# Patient Record
Sex: FEMALE | Race: BLACK OR AFRICAN AMERICAN | NOT HISPANIC OR LATINO
[De-identification: names, ages, dates, MRNs, and addresses within clinical notes are randomized per-mention and may not be internally consistent; named-entity substitution may affect disease eponyms.]

---

## 2023-09-01 ENCOUNTER — APPOINTMENT (OUTPATIENT)
Dept: PODIATRY | Facility: CLINIC | Age: 48
End: 2023-09-01
Payer: COMMERCIAL

## 2023-09-01 DIAGNOSIS — M86.9 OSTEOMYELITIS, UNSPECIFIED: ICD-10-CM

## 2023-09-01 PROCEDURE — 99204 OFFICE O/P NEW MOD 45 MIN: CPT | Mod: 25

## 2023-09-01 PROCEDURE — 73630 X-RAY EXAM OF FOOT: CPT | Mod: RT

## 2023-09-06 PROBLEM — M86.9 OSTEOMYELITIS: Status: ACTIVE | Noted: 2023-09-05

## 2023-09-06 NOTE — PROCEDURE
[FreeTextEntry1] : X-rays were taken to evaluate for bony irregularity or osteomyelitis. X-ray Report: (Right foot - 3 views) I got x-rays that demonstrate good metatarsal parabola without regeneration of bone, without gas in tissue, periosteal break or osteomyelitis.

## 2023-09-06 NOTE — PHYSICAL EXAM
[Delayed in the Left Toes] : capillary refills delayed in the left toes [0] : left foot dorsalis pedis 0 [de-identified] : TMA right side. [FreeTextEntry4] : absent vibratory  [FreeTextEntry8] : absent vibratory  [FreeTextEntry1] : [place]-Rody monofilament testing absent at the hallux, 1st MPJ and heel bilateral.

## 2023-09-06 NOTE — HISTORY OF PRESENT ILLNESS
[FreeTextEntry1] : Patient presents today for a second opinion regarding her right foot. She is S/P TMA amputation. She is a patient of Dr. Davidson who she plans of following up with and she has been seeing Dr. Shannan Le for evaluation. She recently developed a new wound at the right foot and she has been seeing Dr. Le and is in a surgical shoe. She presents today with her daughter. Dr. Le put her on Regranex that she has been using. She is blind and presents today with her daughter. She presents with a surgical shoe. She has insulin dependent diabetes. Her A1c is 10. She states her sugars are 145 this morning. She has Stage 3 renal disease, high blood pressure and hypercholesterolemia. She has peripheral vascular disease and has seen Dr. Maldonado. She told me that one of the 3 arteries works and she is overdue to see Dr. Maldonado.

## 2023-09-06 NOTE — ASSESSMENT
[FreeTextEntry1] : Impression: She had developed a blister that is stable and healing in. Gen. atherosclerosis. TMA right side.  Treatment: Her daughter has been doing the dressings as the patient is legally blind. First I explained to them that she should have a walker in order to further off-load the area. She did not come in with her Plastazote orthotic and she has been in the surgical shoe. I just feel that with a walker she could off-load this area much better and that is extremely important. She also feels the orthotic had flattened down and I think she needs to go back to the orthotist and get that refurbished or remade but I am not seeing it. I recommend instead of the band-aid to use gauze on it and could continue with Regranex on the small superficial healing wound. I do feel while she is watching TV she could let air get to it. There are no clinical signs of infection. Her A1c is high at 10. She needs to get that under better control. The fact that she has renal disease and poor circulation, this is high risk to go downhill and get bad. She needs to definitely get back to vascular. She has not seen vascular in over 6 months and has peripheral vascular disease and now has a new problem. She states that she is going to continue to follow-up. She would prefer to follow-up with Dr. Shi who she was treating and is part of [facility] system where she goes or even Dr. Le. She needs to follow my recommendations in terms of off-loading this area and continuing the wound care and following up with vascular. If she has any difficulty following up with those doctors I am always available.  She states she understands and will follow my recommendations. Today I put a gauze dressing on which is what I am recommending she continues to do.  Her daughter will inspect the foot daily.  Any deterioration or worsening or changes whatsoever they need to immediately to go the Emergency Room as she is a high-risk foot potential for amputation because of all of her comorbidities. They understand. Will follow-up here as needed.

## 2023-09-26 ENCOUNTER — APPOINTMENT (OUTPATIENT)
Dept: PODIATRY | Facility: CLINIC | Age: 48
End: 2023-09-26

## 2023-10-04 ENCOUNTER — APPOINTMENT (OUTPATIENT)
Dept: PODIATRY | Facility: CLINIC | Age: 48
End: 2023-10-04
Payer: MEDICAID

## 2023-10-04 PROCEDURE — 99212 OFFICE O/P EST SF 10 MIN: CPT | Mod: 25

## 2023-10-04 PROCEDURE — 11055 PARING/CUTG B9 HYPRKER LES 1: CPT

## 2023-11-18 ENCOUNTER — EMERGENCY (EMERGENCY)
Facility: HOSPITAL | Age: 48
LOS: 1 days | Discharge: LEFT BEFORE DISCHARGE | End: 2023-11-18
Attending: EMERGENCY MEDICINE | Admitting: EMERGENCY MEDICINE
Payer: MEDICAID

## 2023-11-18 VITALS
DIASTOLIC BLOOD PRESSURE: 87 MMHG | RESPIRATION RATE: 18 BRPM | TEMPERATURE: 98 F | OXYGEN SATURATION: 99 % | SYSTOLIC BLOOD PRESSURE: 170 MMHG | HEART RATE: 97 BPM

## 2023-11-18 PROCEDURE — 93010 ELECTROCARDIOGRAM REPORT: CPT

## 2023-11-18 PROCEDURE — 99285 EMERGENCY DEPT VISIT HI MDM: CPT | Mod: 25

## 2023-11-18 RX ORDER — SODIUM CHLORIDE 9 MG/ML
1000 INJECTION, SOLUTION INTRAVENOUS ONCE
Refills: 0 | Status: DISCONTINUED | OUTPATIENT
Start: 2023-11-18 | End: 2023-11-19

## 2023-11-18 NOTE — ED ADULT TRIAGE NOTE - CHIEF COMPLAINT QUOTE
Pt c/o muscle cramping x several weeks. States she feels like her limbs are "tightening up". Also endorsing chest discomfort today. Denies N/V/D, fevers/chills,SOB. PMHx DM2, HTN, HLD, partial R foot amputation

## 2023-11-19 VITALS
DIASTOLIC BLOOD PRESSURE: 65 MMHG | SYSTOLIC BLOOD PRESSURE: 134 MMHG | TEMPERATURE: 99 F | OXYGEN SATURATION: 100 % | RESPIRATION RATE: 18 BRPM | HEART RATE: 81 BPM

## 2023-11-19 DIAGNOSIS — Z89.431 ACQUIRED ABSENCE OF RIGHT FOOT: Chronic | ICD-10-CM

## 2023-11-19 LAB
ALBUMIN SERPL ELPH-MCNC: 3.8 G/DL — SIGNIFICANT CHANGE UP (ref 3.3–5)
ALBUMIN SERPL ELPH-MCNC: 3.8 G/DL — SIGNIFICANT CHANGE UP (ref 3.3–5)
ALP SERPL-CCNC: 107 U/L — SIGNIFICANT CHANGE UP (ref 40–120)
ALP SERPL-CCNC: 107 U/L — SIGNIFICANT CHANGE UP (ref 40–120)
ALT FLD-CCNC: 15 U/L — SIGNIFICANT CHANGE UP (ref 4–33)
ALT FLD-CCNC: 15 U/L — SIGNIFICANT CHANGE UP (ref 4–33)
ANION GAP SERPL CALC-SCNC: 16 MMOL/L — HIGH (ref 7–14)
ANION GAP SERPL CALC-SCNC: 16 MMOL/L — HIGH (ref 7–14)
AST SERPL-CCNC: 24 U/L — SIGNIFICANT CHANGE UP (ref 4–32)
AST SERPL-CCNC: 24 U/L — SIGNIFICANT CHANGE UP (ref 4–32)
BASOPHILS # BLD AUTO: 0.03 K/UL — SIGNIFICANT CHANGE UP (ref 0–0.2)
BASOPHILS # BLD AUTO: 0.03 K/UL — SIGNIFICANT CHANGE UP (ref 0–0.2)
BASOPHILS NFR BLD AUTO: 0.3 % — SIGNIFICANT CHANGE UP (ref 0–2)
BASOPHILS NFR BLD AUTO: 0.3 % — SIGNIFICANT CHANGE UP (ref 0–2)
BILIRUB SERPL-MCNC: <0.2 MG/DL — SIGNIFICANT CHANGE UP (ref 0.2–1.2)
BILIRUB SERPL-MCNC: <0.2 MG/DL — SIGNIFICANT CHANGE UP (ref 0.2–1.2)
BUN SERPL-MCNC: 33 MG/DL — HIGH (ref 7–23)
BUN SERPL-MCNC: 33 MG/DL — HIGH (ref 7–23)
CALCIUM SERPL-MCNC: 8.9 MG/DL — SIGNIFICANT CHANGE UP (ref 8.4–10.5)
CALCIUM SERPL-MCNC: 8.9 MG/DL — SIGNIFICANT CHANGE UP (ref 8.4–10.5)
CHLORIDE SERPL-SCNC: 100 MMOL/L — SIGNIFICANT CHANGE UP (ref 98–107)
CHLORIDE SERPL-SCNC: 100 MMOL/L — SIGNIFICANT CHANGE UP (ref 98–107)
CO2 SERPL-SCNC: 20 MMOL/L — LOW (ref 22–31)
CO2 SERPL-SCNC: 20 MMOL/L — LOW (ref 22–31)
CREAT SERPL-MCNC: 2.3 MG/DL — HIGH (ref 0.5–1.3)
CREAT SERPL-MCNC: 2.3 MG/DL — HIGH (ref 0.5–1.3)
EGFR: 25 ML/MIN/1.73M2 — LOW
EGFR: 25 ML/MIN/1.73M2 — LOW
EOSINOPHIL # BLD AUTO: 0.23 K/UL — SIGNIFICANT CHANGE UP (ref 0–0.5)
EOSINOPHIL # BLD AUTO: 0.23 K/UL — SIGNIFICANT CHANGE UP (ref 0–0.5)
EOSINOPHIL NFR BLD AUTO: 2.7 % — SIGNIFICANT CHANGE UP (ref 0–6)
EOSINOPHIL NFR BLD AUTO: 2.7 % — SIGNIFICANT CHANGE UP (ref 0–6)
GLUCOSE SERPL-MCNC: 165 MG/DL — HIGH (ref 70–99)
GLUCOSE SERPL-MCNC: 165 MG/DL — HIGH (ref 70–99)
HCT VFR BLD CALC: 32.9 % — LOW (ref 34.5–45)
HCT VFR BLD CALC: 32.9 % — LOW (ref 34.5–45)
HGB BLD-MCNC: 10.9 G/DL — LOW (ref 11.5–15.5)
HGB BLD-MCNC: 10.9 G/DL — LOW (ref 11.5–15.5)
IANC: 5.6 K/UL — SIGNIFICANT CHANGE UP (ref 1.8–7.4)
IANC: 5.6 K/UL — SIGNIFICANT CHANGE UP (ref 1.8–7.4)
IMM GRANULOCYTES NFR BLD AUTO: 0.5 % — SIGNIFICANT CHANGE UP (ref 0–0.9)
IMM GRANULOCYTES NFR BLD AUTO: 0.5 % — SIGNIFICANT CHANGE UP (ref 0–0.9)
LYMPHOCYTES # BLD AUTO: 2.17 K/UL — SIGNIFICANT CHANGE UP (ref 1–3.3)
LYMPHOCYTES # BLD AUTO: 2.17 K/UL — SIGNIFICANT CHANGE UP (ref 1–3.3)
LYMPHOCYTES # BLD AUTO: 25.1 % — SIGNIFICANT CHANGE UP (ref 13–44)
LYMPHOCYTES # BLD AUTO: 25.1 % — SIGNIFICANT CHANGE UP (ref 13–44)
MAGNESIUM SERPL-MCNC: 1.9 MG/DL — SIGNIFICANT CHANGE UP (ref 1.6–2.6)
MAGNESIUM SERPL-MCNC: 1.9 MG/DL — SIGNIFICANT CHANGE UP (ref 1.6–2.6)
MCHC RBC-ENTMCNC: 28.8 PG — SIGNIFICANT CHANGE UP (ref 27–34)
MCHC RBC-ENTMCNC: 28.8 PG — SIGNIFICANT CHANGE UP (ref 27–34)
MCHC RBC-ENTMCNC: 33.1 GM/DL — SIGNIFICANT CHANGE UP (ref 32–36)
MCHC RBC-ENTMCNC: 33.1 GM/DL — SIGNIFICANT CHANGE UP (ref 32–36)
MCV RBC AUTO: 87 FL — SIGNIFICANT CHANGE UP (ref 80–100)
MCV RBC AUTO: 87 FL — SIGNIFICANT CHANGE UP (ref 80–100)
MONOCYTES # BLD AUTO: 0.59 K/UL — SIGNIFICANT CHANGE UP (ref 0–0.9)
MONOCYTES # BLD AUTO: 0.59 K/UL — SIGNIFICANT CHANGE UP (ref 0–0.9)
MONOCYTES NFR BLD AUTO: 6.8 % — SIGNIFICANT CHANGE UP (ref 2–14)
MONOCYTES NFR BLD AUTO: 6.8 % — SIGNIFICANT CHANGE UP (ref 2–14)
NEUTROPHILS # BLD AUTO: 5.6 K/UL — SIGNIFICANT CHANGE UP (ref 1.8–7.4)
NEUTROPHILS # BLD AUTO: 5.6 K/UL — SIGNIFICANT CHANGE UP (ref 1.8–7.4)
NEUTROPHILS NFR BLD AUTO: 64.6 % — SIGNIFICANT CHANGE UP (ref 43–77)
NEUTROPHILS NFR BLD AUTO: 64.6 % — SIGNIFICANT CHANGE UP (ref 43–77)
NRBC # BLD: 0 /100 WBCS — SIGNIFICANT CHANGE UP (ref 0–0)
NRBC # BLD: 0 /100 WBCS — SIGNIFICANT CHANGE UP (ref 0–0)
NRBC # FLD: 0 K/UL — SIGNIFICANT CHANGE UP (ref 0–0)
NRBC # FLD: 0 K/UL — SIGNIFICANT CHANGE UP (ref 0–0)
PLATELET # BLD AUTO: 256 K/UL — SIGNIFICANT CHANGE UP (ref 150–400)
PLATELET # BLD AUTO: 256 K/UL — SIGNIFICANT CHANGE UP (ref 150–400)
POTASSIUM SERPL-MCNC: 5.2 MMOL/L — SIGNIFICANT CHANGE UP (ref 3.5–5.3)
POTASSIUM SERPL-MCNC: 5.2 MMOL/L — SIGNIFICANT CHANGE UP (ref 3.5–5.3)
POTASSIUM SERPL-SCNC: 5.2 MMOL/L — SIGNIFICANT CHANGE UP (ref 3.5–5.3)
POTASSIUM SERPL-SCNC: 5.2 MMOL/L — SIGNIFICANT CHANGE UP (ref 3.5–5.3)
PROT SERPL-MCNC: 7.4 G/DL — SIGNIFICANT CHANGE UP (ref 6–8.3)
PROT SERPL-MCNC: 7.4 G/DL — SIGNIFICANT CHANGE UP (ref 6–8.3)
RBC # BLD: 3.78 M/UL — LOW (ref 3.8–5.2)
RBC # BLD: 3.78 M/UL — LOW (ref 3.8–5.2)
RBC # FLD: 12.8 % — SIGNIFICANT CHANGE UP (ref 10.3–14.5)
RBC # FLD: 12.8 % — SIGNIFICANT CHANGE UP (ref 10.3–14.5)
SODIUM SERPL-SCNC: 136 MMOL/L — SIGNIFICANT CHANGE UP (ref 135–145)
SODIUM SERPL-SCNC: 136 MMOL/L — SIGNIFICANT CHANGE UP (ref 135–145)
TROPONIN T, HIGH SENSITIVITY RESULT: 87 NG/L — CRITICAL HIGH
TROPONIN T, HIGH SENSITIVITY RESULT: 87 NG/L — CRITICAL HIGH
WBC # BLD: 8.66 K/UL — SIGNIFICANT CHANGE UP (ref 3.8–10.5)
WBC # BLD: 8.66 K/UL — SIGNIFICANT CHANGE UP (ref 3.8–10.5)
WBC # FLD AUTO: 8.66 K/UL — SIGNIFICANT CHANGE UP (ref 3.8–10.5)
WBC # FLD AUTO: 8.66 K/UL — SIGNIFICANT CHANGE UP (ref 3.8–10.5)

## 2023-11-19 PROCEDURE — 71045 X-RAY EXAM CHEST 1 VIEW: CPT | Mod: 26

## 2023-11-19 RX ORDER — ASPIRIN/CALCIUM CARB/MAGNESIUM 324 MG
324 TABLET ORAL ONCE
Refills: 0 | Status: COMPLETED | OUTPATIENT
Start: 2023-11-19 | End: 2023-11-19

## 2023-11-19 NOTE — ED PROVIDER NOTE - NSFOLLOWUPINSTRUCTIONS_ED_ALL_ED_FT
You reported to the hospital with ongoing muscle aches in your arms, neck, and legs as well as chest pain. You were evaluated in the hospital and had findings concerning for damage to your heart, notably that your troponin level was elevated to 87. This is a level that is used to track damage in a heart attack and would normally require hospitalization for monitoring and potential intervention. This is concerning in your case due to your ongoing chest pain which is often associated with heart attacks.    We discussed the risks of leaving the hospital without a full cardiac work-up given your current presentation and that you are at risk for developing a heart attack. You expressed understanding of the risks and have elected to follow up with your outpatient cardiologist. By leaving [facility] you have a significant risk of having a heart attack or passing away.    We have provided a copy of all of your laboratory work-up here in the hospital, please provide this information to your cardiologist and follow up with him as soon as possible.    Please start taking 81mg of aspirin (1 baby aspirin) every day while you are awaiting evaluation by your cardiologist.    Return to the hospital immediately if:  - You begin to experience a sharp pain in your chest that radiates to your left shoulder  - You begin to feel dizzy or confused  - You become acutely short of breath  - You faint  - You become easily winded walking 30ft or less

## 2023-11-19 NOTE — ED PROVIDER NOTE - OBJECTIVE STATEMENT
The patient is a 47yo woman with a history DM, HTN, HLD, and partial R foot amputation presenting with muscle cramps. The patient has a history of uncontrolled hypertension for which she is seeking optimization of her drug regimen with her PCP and cardiologist. The patient recently developed lower extremity swelling secondary to one of her antihypertensive medications and was started on lasix to improve fluid retention. At a recent outpatient appointment, the patient's potassium was found to be increased to 5.4. The patient was encouraged to increase PO intake. Upon discussion with the patient, she has not always taken her lasix medication.    The patient now presents with sporadic muscles cramps in her neck, arms, and legs. The patient is says these are intermittent but increasing in frequency over the past few days. The patient also endorses intermittent central chest pain that does not radiate.

## 2023-11-19 NOTE — ED PROVIDER NOTE - PATIENT PORTAL LINK FT
You can access the FollowMyHealth Patient Portal offered by [facility] by registering at the following website: [email or web address]. By joining [facility]’s FollowMyHealth portal, you will also be able to view your health information using other applications (apps) compatible with our system.

## 2023-11-19 NOTE — ED ADULT NURSE NOTE - NSFALLUNIVINTERV_ED_ALL_ED
Bed/Stretcher in lowest position, wheels locked, appropriate side rails in place/Call bell, personal items and telephone in reach/Instruct patient to call for assistance before getting out of bed/chair/stretcher/Non-slip footwear applied when patient is off stretcher/[place] to call system/Physically safe environment - no spills, clutter or unnecessary equipment/Purposeful proactive rounding/Room/bathroom lighting operational, light cord in reach

## 2023-11-19 NOTE — ED PROVIDER NOTE - ATTENDING CONTRIBUTION TO CARE
48F HTN HLD DM R TMA p/w muscle cramping.  BP has been hard to control has been changing meds.  Per pt K was elevated to 5.4. Noted swelling to LE after recent med change, has been put on lasix, but stopped after swelling improved.  Muscle cramps in arms, legs x 2 weeks.  CP started today SSCP, no associated symptoms.  Pt concerned for hyperkalemia.  Plan check labs, trop, EKG, rx fluids, reass.  EKG SR at 91 no srinivas no std TWI aVL   qtc 430.  Found to have abnormal trop.  Pt advised she is likely having a heart attack and should stay in hospital and see a cardiologist.  Pt does not want to stay - asked several times- pt prefers to follow up with her own cardiologist as outpt. Pt does not elaborate why.  Pt has capacity to make this decision.  Pt understands risk of death, MI at any point.  Pt understands she can return at any time to continue her evaluation.    VS:  unremarkable except HTN    GEN - NAD;  malaise;   A+O x3   HEAD - NC/AT     ENT - PEERL, EOMI, mucous membranes    moist , no discharge      NECK: Neck supple, non-tender without lymphadenopathy, no masses, no JVD  PULM - CTA b/l,  symmetric breath sounds  COR -  normal heart sounds    ABD - , ND, NT, soft,  BACK - no CVA tenderness, nontender spine     EXTREMS - no edema, no deformity, warm and well perfused    SKIN - no rash    or bruising      NEUROLOGIC - alert, face symmetric, speech fluent, sensation nl, motor no focal deficit.

## 2023-11-19 NOTE — ED ADULT NURSE NOTE - NSFALLOOBATTEMPT_ED_ALL_ED
Render In Strict Bullet Format?: No
Detail Level: Zone
Continue Regimen: Myorisan 30 mg capsule: Take one capsule po BID
No

## 2023-11-19 NOTE — ED ADULT NURSE NOTE - OBJECTIVE STATEMENT
Facilitator RN: Pt A&Ox4 ambulatory, PMH DM2, partial R foot amputation, HTN, presenting to the ED (RM 27) c/o muscle cramping x couple of months. Pt states has been endorsing intermittent muscle cramping. Pt states today had muscle cramping to left arm L side of body and LLE. Pt also states was having chest discomfort today. Pt states on October, PCP informed her that potassium level was 5.6. Pt states was recommended to drink plenty water. Pt states never had a follow-up for blood-work. Pt denies any other complaints. Denies any chest discomfort at this moment. Pt refusing IV placement, MD Gotti made aware, pt butterflied for labs, pitcher of water handed to patient as per MD orders, Safety precautions implemented as per protocol, plan of care ongoing. Report endorsed to primary RN Oneyda.

## 2023-12-06 ENCOUNTER — APPOINTMENT (OUTPATIENT)
Dept: PODIATRY | Facility: CLINIC | Age: 48
End: 2023-12-06
Payer: MEDICAID

## 2023-12-06 DIAGNOSIS — I70.91 GENERALIZED ATHEROSCLEROSIS: ICD-10-CM

## 2023-12-06 PROBLEM — E78.5 HYPERLIPIDEMIA, UNSPECIFIED: Chronic | Status: ACTIVE | Noted: 2023-11-19

## 2023-12-06 PROBLEM — I10 ESSENTIAL (PRIMARY) HYPERTENSION: Chronic | Status: ACTIVE | Noted: 2023-11-19

## 2023-12-06 PROBLEM — E11.9 TYPE 2 DIABETES MELLITUS WITHOUT COMPLICATIONS: Chronic | Status: ACTIVE | Noted: 2023-11-19

## 2023-12-06 PROCEDURE — 11055 PARING/CUTG B9 HYPRKER LES 1: CPT | Mod: Q9

## 2023-12-06 PROCEDURE — 11720 DEBRIDE NAIL 1-5: CPT | Mod: Q9,59

## 2023-12-08 PROBLEM — I70.91 GENERALIZED ATHEROSCLEROSIS: Status: ACTIVE | Noted: 2023-09-05

## 2024-02-14 ENCOUNTER — APPOINTMENT (OUTPATIENT)
Dept: PODIATRY | Facility: CLINIC | Age: 49
End: 2024-02-14
Payer: MEDICAID

## 2024-02-14 PROCEDURE — 11720 DEBRIDE NAIL 1-5: CPT | Mod: Q9,59

## 2024-02-14 PROCEDURE — 11055 PARING/CUTG B9 HYPRKER LES 1: CPT | Mod: Q9

## 2024-02-23 NOTE — HISTORY OF PRESENT ILLNESS
[FreeTextEntry1] : Patient presents today for at-risk foot care.  She has TMA stump with keratosis on the right side and deformed mycotic nails 1, 2, 3, 4 and 5 on the left. She cannot care for it herself. She is under the care of Dr. Maldonado for vascular who she recently saw and wants to see her just once a year.

## 2024-02-23 NOTE — PHYSICAL EXAM
[Delayed in the Left Toes] : capillary refills delayed in the left toes [0] : left foot dorsalis pedis 0 [FreeTextEntry3] : The patient has a class finding of Q7-Non-Traumatic amputation of foot or integral skeletal portion thereof  [de-identified] : TMA right side, stable. No infection.  No breakdown. [FreeTextEntry4] : absent vibratory  [FreeTextEntry1] : [place]-Rody monofilament testing absent at the hallux, 1st MPJ and heel left. [FreeTextEntry8] : absent vibratory

## 2024-02-23 NOTE — ASSESSMENT
[FreeTextEntry1] : Impression: Onychomycosis left. TMA right. IPK. Pain in toes.  Treatment: I trimmed keratosis on the right side very carefully and without incident. I debrided mycotic nails 1 through 5 on the left. I discussed the importance of very comfortable shoes and using her orthotics at all times. I referred her to [facility] once again because she is entitled to a new pair of orthotics. She needs to inspect the foot daily. I want her to use Aquaphor on the stump on the right side. I discussed stretching. I wrote a prescription for a rollator. She will follow-up for high risk foot care.

## 2024-04-09 ENCOUNTER — APPOINTMENT (OUTPATIENT)
Dept: PODIATRY | Facility: CLINIC | Age: 49
End: 2024-04-09
Payer: MEDICAID

## 2024-04-09 DIAGNOSIS — E11.49 TYPE 2 DIABETES MELLITUS WITH OTHER DIABETIC NEUROLOGICAL COMPLICATION: ICD-10-CM

## 2024-04-09 DIAGNOSIS — Z89.431 ACQUIRED ABSENCE OF RIGHT FOOT: ICD-10-CM

## 2024-04-09 DIAGNOSIS — M79.675 PAIN IN LEFT TOE(S): ICD-10-CM

## 2024-04-09 DIAGNOSIS — B35.1 TINEA UNGUIUM: ICD-10-CM

## 2024-04-09 DIAGNOSIS — L85.1 ACQUIRED KERATOSIS [KERATODERMA] PALMARIS ET PLANTARIS: ICD-10-CM

## 2024-04-09 PROCEDURE — 11720 DEBRIDE NAIL 1-5: CPT | Mod: Q9,59

## 2024-04-09 PROCEDURE — 11055 PARING/CUTG B9 HYPRKER LES 1: CPT | Mod: Q9

## 2024-04-10 PROBLEM — L85.1 KERATODERMA, ACQUIRED: Status: ACTIVE | Noted: 2023-10-06

## 2024-04-10 PROBLEM — B35.1 ONYCHOMYCOSIS: Status: ACTIVE | Noted: 2023-12-08

## 2024-04-10 PROBLEM — E11.49 DM (DIABETES MELLITUS), TYPE 2 WITH NEUROLOGICAL COMPLICATIONS: Status: ACTIVE | Noted: 2023-10-06

## 2024-04-10 PROBLEM — Z89.431 HISTORY OF TRANSMETATARSAL AMPUTATION OF RIGHT FOOT: Status: ACTIVE | Noted: 2023-09-05

## 2024-04-10 PROBLEM — M79.675 PAIN OF TOE OF LEFT FOOT: Status: ACTIVE | Noted: 2024-02-15

## 2024-04-11 NOTE — PHYSICAL EXAM
[Delayed in the Left Toes] : capillary refills delayed in the left toes [0] : left foot dorsalis pedis 0 [FreeTextEntry3] : The patient has a class finding of Q7-Non-Traumatic amputation of foot or integral skeletal portion thereof  [de-identified] : TMA right side, stable. No infection.  No breakdown. [FreeTextEntry4] : absent vibratory  [FreeTextEntry8] : absent vibratory  [FreeTextEntry1] : [place]-Rody monofilament testing absent at the hallux, 1st MPJ and heel left.

## 2024-04-11 NOTE — ASSESSMENT
[FreeTextEntry1] : Impression: Onychomycosis left. TMA right. IPK. Pain in toes. Diabetes with neuropathy.  Treatment: I trimmed keratosis on the right side very carefully and without incident. I debrided mycotic nails 1 through 5 on the left. I discussed the importance of very comfortable shoes and using her orthotics at all times.  She needs to inspect the foot daily. I want her to use Aquaphor on the stump on the right side. She will follow-up with vascular yearly. Follow-up for routine care. Call with any issues whatsoever.

## 2024-07-24 ENCOUNTER — APPOINTMENT (OUTPATIENT)
Dept: PODIATRY | Facility: CLINIC | Age: 49
End: 2024-07-24

## 2024-07-24 DIAGNOSIS — Z89.431 ACQUIRED ABSENCE OF RIGHT FOOT: ICD-10-CM

## 2024-07-24 DIAGNOSIS — E11.49 TYPE 2 DIABETES MELLITUS WITH OTHER DIABETIC NEUROLOGICAL COMPLICATION: ICD-10-CM

## 2024-07-24 PROCEDURE — 99212 OFFICE O/P EST SF 10 MIN: CPT

## 2024-07-26 NOTE — ED PROVIDER NOTE - CLINICAL SUMMARY MEDICAL DECISION MAKING FREE TEXT BOX
The patient is a 47yo woman with a history DM, HTN, HLD, and partial R foot amputation presenting with muscle cramps. DDx includes but not limited to hyperkalemia, ACS, and MSK pain. Will obtain CBC, CMP, troponin, CXR. Encourage PO intake.
Attending Attestation (For Attendings USE Only)...

## 2024-07-29 NOTE — HISTORY OF PRESENT ILLNESS
[FreeTextEntry1] : Patient presents today because of right TMA and diabetic foot care. Her A1c is 9.2. Her diabetes has been off control. She is working now with her endocrinologist and renal doctor because of renal insufficiency. It is improving. She doesn't want a filler with an orthotic for her shoe and she presents today without socks, which she has been refusing to wear.

## 2024-07-29 NOTE — PHYSICAL EXAM
[Delayed in the Left Toes] : capillary refills delayed in the left toes [0] : left foot dorsalis pedis 0 [FreeTextEntry3] : The patient has a class finding of Q7-Non-Traumatic amputation of foot or integral skeletal portion thereof  [de-identified] : TMA right side, stable. No infection.  No breakdown. [FreeTextEntry4] : absent vibratory  [FreeTextEntry8] : absent vibratory  [FreeTextEntry1] : [place]-Rody monofilament testing absent at the hallux, 1st MPJ and heel left.

## 2024-07-29 NOTE — PHYSICAL EXAM
[Delayed in the Left Toes] : capillary refills delayed in the left toes [0] : left foot dorsalis pedis 0 [FreeTextEntry3] : The patient has a class finding of Q7-Non-Traumatic amputation of foot or integral skeletal portion thereof  [de-identified] : TMA right side, stable. No infection.  No breakdown. [FreeTextEntry4] : absent vibratory  [FreeTextEntry8] : absent vibratory  [FreeTextEntry1] : [place]-Rody monofilament testing absent at the hallux, 1st MPJ and heel left.

## 2024-07-29 NOTE — ASSESSMENT
[FreeTextEntry1] : Impression: Diabetes with neuropathy. TMA right foot.  Treatment: I very cautiously trimmed the keratosis, after prepping the area with alcohol, at the TMA stump which is present. I debrided the left foot mycotic nails both manually and mechanically utilizing a small straight nail splitter and rotary boris, without incident. I stressed the importance of better glycemic control. If she is not going to wear the TMA filler with orthotic, I want her to get new memory foam shoes, so they are nice and cushioned. I insisted that she wears diabetic socks to cushion the area. She also has a silicone impregnated TMA sock. She can't see so she needs to have someone inspect her feet at home. I am going to see her for routine high risk foot care in 2 1/2 months.

## 2024-07-29 NOTE — PHYSICAL EXAM
[Delayed in the Left Toes] : capillary refills delayed in the left toes [0] : left foot dorsalis pedis 0 [FreeTextEntry3] : The patient has a class finding of Q7-Non-Traumatic amputation of foot or integral skeletal portion thereof  [de-identified] : TMA right side, stable. No infection.  No breakdown. [FreeTextEntry4] : absent vibratory  [FreeTextEntry8] : absent vibratory  [FreeTextEntry1] : [place]-Rody monofilament testing absent at the hallux, 1st MPJ and heel left.

## 2024-09-25 ENCOUNTER — APPOINTMENT (OUTPATIENT)
Dept: PODIATRY | Facility: CLINIC | Age: 49
End: 2024-09-25

## 2024-09-25 DIAGNOSIS — B35.1 TINEA UNGUIUM: ICD-10-CM

## 2024-09-25 DIAGNOSIS — E11.49 TYPE 2 DIABETES MELLITUS WITH OTHER DIABETIC NEUROLOGICAL COMPLICATION: ICD-10-CM

## 2024-09-25 DIAGNOSIS — L85.1 ACQUIRED KERATOSIS [KERATODERMA] PALMARIS ET PLANTARIS: ICD-10-CM

## 2024-09-25 DIAGNOSIS — Z89.431 ACQUIRED ABSENCE OF RIGHT FOOT: ICD-10-CM

## 2024-09-25 PROCEDURE — 11055 PARING/CUTG B9 HYPRKER LES 1: CPT | Mod: Q8

## 2024-09-25 PROCEDURE — 11720 DEBRIDE NAIL 1-5: CPT | Mod: Q8,59

## 2024-10-01 NOTE — PHYSICAL EXAM
[Delayed in the Left Toes] : capillary refills delayed in the left toes [0] : left foot dorsalis pedis 0 [FreeTextEntry3] : The patient has a class finding of Q7-Non-Traumatic amputation of foot or integral skeletal portion thereof  [de-identified] : TMA right side, stable. No infection.  No breakdown. [FreeTextEntry4] : absent vibratory  [FreeTextEntry8] : absent vibratory  [FreeTextEntry1] : [place]-Rody monofilament testing absent at the hallux, 1st MPJ and heel left.

## 2024-10-01 NOTE — ASSESSMENT
[FreeTextEntry1] : Impression: Diabetes with neuropathy. TMA right foot. IPK. Onychomycosis.  Treatment: I very cautiously trimmed the keratosis, after prepping the area with alcohol, at the TMA stump without incident. I applied lotion. I debrided the left foot mycotic nails both manually and mechanically utilizing a small straight nail splitter and rotary boris, without incident. I stressed the importance of glycemic control. Continue the metatarsal TMA sock and have someone inspect the feet daily. Follow-up again in 2 months.

## 2024-10-01 NOTE — PHYSICAL EXAM
[Delayed in the Left Toes] : capillary refills delayed in the left toes [0] : left foot dorsalis pedis 0 [FreeTextEntry3] : The patient has a class finding of Q7-Non-Traumatic amputation of foot or integral skeletal portion thereof  [de-identified] : TMA right side, stable. No infection.  No breakdown. [FreeTextEntry4] : absent vibratory  [FreeTextEntry8] : absent vibratory  [FreeTextEntry1] : [place]-Rody monofilament testing absent at the hallux, 1st MPJ and heel left.

## 2024-10-01 NOTE — HISTORY OF PRESENT ILLNESS
[FreeTextEntry1] : Patient presents today for evaluation. She has a TMA stump with keratosis. There is no ulcer or breakdown. She has onychomycosis at the left foot in multiple nails that are getting worse.

## 2024-10-01 NOTE — PHYSICAL EXAM
[Delayed in the Left Toes] : capillary refills delayed in the left toes [0] : left foot dorsalis pedis 0 [FreeTextEntry3] : The patient has a class finding of Q7-Non-Traumatic amputation of foot or integral skeletal portion thereof  [de-identified] : TMA right side, stable. No infection.  No breakdown. [FreeTextEntry4] : absent vibratory  [FreeTextEntry8] : absent vibratory  [FreeTextEntry1] : [place]-Rody monofilament testing absent at the hallux, 1st MPJ and heel left.

## 2024-11-20 ENCOUNTER — APPOINTMENT (OUTPATIENT)
Dept: PODIATRY | Facility: CLINIC | Age: 49
End: 2024-11-20

## 2024-11-20 DIAGNOSIS — Z91.81 HISTORY OF FALLING: ICD-10-CM

## 2024-11-20 DIAGNOSIS — L89.891 PRESSURE ULCER OF OTHER SITE, STAGE 1: ICD-10-CM

## 2024-11-20 DIAGNOSIS — L60.3 NAIL DYSTROPHY: ICD-10-CM

## 2024-11-20 DIAGNOSIS — R26.89 OTHER ABNORMALITIES OF GAIT AND MOBILITY: ICD-10-CM

## 2024-11-20 DIAGNOSIS — Z89.431 ACQUIRED ABSENCE OF RIGHT FOOT: ICD-10-CM

## 2024-11-20 DIAGNOSIS — E11.49 TYPE 2 DIABETES MELLITUS WITH OTHER DIABETIC NEUROLOGICAL COMPLICATION: ICD-10-CM

## 2024-11-20 PROCEDURE — 99213 OFFICE O/P EST LOW 20 MIN: CPT

## 2024-11-26 PROBLEM — R26.89 ANTALGIC GAIT: Status: ACTIVE | Noted: 2024-11-25

## 2024-11-26 PROBLEM — Z91.81 RISK FOR FALLS: Status: ACTIVE | Noted: 2024-11-25

## 2024-11-26 PROBLEM — L89.891 PRESSURE INJURY OF RIGHT FOOT, STAGE 1: Status: ACTIVE | Noted: 2024-11-25

## 2024-11-26 PROBLEM — L60.3 NAIL DYSTROPHY: Status: ACTIVE | Noted: 2024-11-25

## 2025-01-08 ENCOUNTER — APPOINTMENT (OUTPATIENT)
Dept: PODIATRY | Facility: CLINIC | Age: 50
End: 2025-01-08

## 2025-01-08 DIAGNOSIS — E11.49 TYPE 2 DIABETES MELLITUS WITH OTHER DIABETIC NEUROLOGICAL COMPLICATION: ICD-10-CM

## 2025-01-08 DIAGNOSIS — B35.1 TINEA UNGUIUM: ICD-10-CM

## 2025-01-08 DIAGNOSIS — Z89.431 ACQUIRED ABSENCE OF RIGHT FOOT: ICD-10-CM

## 2025-01-08 DIAGNOSIS — L85.1 ACQUIRED KERATOSIS [KERATODERMA] PALMARIS ET PLANTARIS: ICD-10-CM

## 2025-01-08 PROCEDURE — 11055 PARING/CUTG B9 HYPRKER LES 1: CPT

## 2025-01-08 PROCEDURE — 99212 OFFICE O/P EST SF 10 MIN: CPT | Mod: 25

## 2025-03-12 ENCOUNTER — APPOINTMENT (OUTPATIENT)
Dept: PODIATRY | Facility: CLINIC | Age: 50
End: 2025-03-12

## 2025-03-12 DIAGNOSIS — Z89.431 ACQUIRED ABSENCE OF RIGHT FOOT: ICD-10-CM

## 2025-03-12 DIAGNOSIS — L85.1 ACQUIRED KERATOSIS [KERATODERMA] PALMARIS ET PLANTARIS: ICD-10-CM

## 2025-03-12 DIAGNOSIS — E11.49 TYPE 2 DIABETES MELLITUS WITH OTHER DIABETIC NEUROLOGICAL COMPLICATION: ICD-10-CM

## 2025-03-12 DIAGNOSIS — B35.1 TINEA UNGUIUM: ICD-10-CM

## 2025-03-12 PROCEDURE — 11720 DEBRIDE NAIL 1-5: CPT | Mod: Q9,59

## 2025-03-12 PROCEDURE — 11055 PARING/CUTG B9 HYPRKER LES 1: CPT | Mod: Q9

## 2025-06-03 ENCOUNTER — NON-APPOINTMENT (OUTPATIENT)
Age: 50
End: 2025-06-03

## 2025-06-04 ENCOUNTER — APPOINTMENT (OUTPATIENT)
Dept: PODIATRY | Facility: CLINIC | Age: 50
End: 2025-06-04
Payer: MEDICAID

## 2025-06-04 DIAGNOSIS — M79.673 PAIN IN UNSPECIFIED FOOT: ICD-10-CM

## 2025-06-04 DIAGNOSIS — L85.1 ACQUIRED KERATOSIS [KERATODERMA] PALMARIS ET PLANTARIS: ICD-10-CM

## 2025-06-04 DIAGNOSIS — E11.49 TYPE 2 DIABETES MELLITUS WITH OTHER DIABETIC NEUROLOGICAL COMPLICATION: ICD-10-CM

## 2025-06-04 DIAGNOSIS — Z89.431 ACQUIRED ABSENCE OF RIGHT FOOT: ICD-10-CM

## 2025-06-04 PROCEDURE — 99212 OFFICE O/P EST SF 10 MIN: CPT

## 2025-06-05 PROBLEM — M79.673 PAIN, FOOT: Status: ACTIVE | Noted: 2025-06-05

## 2025-08-06 ENCOUNTER — APPOINTMENT (OUTPATIENT)
Dept: PODIATRY | Facility: CLINIC | Age: 50
End: 2025-08-06

## 2025-08-06 DIAGNOSIS — E11.49 TYPE 2 DIABETES MELLITUS WITH OTHER DIABETIC NEUROLOGICAL COMPLICATION: ICD-10-CM

## 2025-08-06 DIAGNOSIS — I70.91 GENERALIZED ATHEROSCLEROSIS: ICD-10-CM

## 2025-08-06 DIAGNOSIS — Z89.431 ACQUIRED ABSENCE OF RIGHT FOOT: ICD-10-CM

## 2025-08-06 PROCEDURE — 99212 OFFICE O/P EST SF 10 MIN: CPT
